# Patient Record
Sex: MALE
[De-identification: names, ages, dates, MRNs, and addresses within clinical notes are randomized per-mention and may not be internally consistent; named-entity substitution may affect disease eponyms.]

---

## 2019-11-05 PROBLEM — Z00.00 ENCOUNTER FOR PREVENTIVE HEALTH EXAMINATION: Status: ACTIVE | Noted: 2019-11-05

## 2019-11-13 ENCOUNTER — APPOINTMENT (OUTPATIENT)
Dept: UROLOGY | Facility: CLINIC | Age: 74
End: 2019-11-13
Payer: MEDICARE

## 2019-11-13 VITALS
BODY MASS INDEX: 20.73 KG/M2 | SYSTOLIC BLOOD PRESSURE: 174 MMHG | TEMPERATURE: 96 F | HEART RATE: 80 BPM | WEIGHT: 140 LBS | HEIGHT: 69 IN | DIASTOLIC BLOOD PRESSURE: 88 MMHG

## 2019-11-13 DIAGNOSIS — Z87.438 PERSONAL HISTORY OF OTHER DISEASES OF MALE GENITAL ORGANS: ICD-10-CM

## 2019-11-13 DIAGNOSIS — Z85.6 PERSONAL HISTORY OF LEUKEMIA: ICD-10-CM

## 2019-11-13 DIAGNOSIS — R35.0 FREQUENCY OF MICTURITION: ICD-10-CM

## 2019-11-13 DIAGNOSIS — Z87.442 PERSONAL HISTORY OF URINARY CALCULI: ICD-10-CM

## 2019-11-13 DIAGNOSIS — Z86.79 PERSONAL HISTORY OF OTHER DISEASES OF THE CIRCULATORY SYSTEM: ICD-10-CM

## 2019-11-13 DIAGNOSIS — Z87.19 PERSONAL HISTORY OF OTHER DISEASES OF THE DIGESTIVE SYSTEM: ICD-10-CM

## 2019-11-13 DIAGNOSIS — R31.29 OTHER MICROSCOPIC HEMATURIA: ICD-10-CM

## 2019-11-13 PROCEDURE — 51798 US URINE CAPACITY MEASURE: CPT

## 2019-11-13 PROCEDURE — 99204 OFFICE O/P NEW MOD 45 MIN: CPT | Mod: 25

## 2019-11-13 RX ORDER — POTASSIUM CHLORIDE 1500 MG/1
20 TABLET, EXTENDED RELEASE ORAL
Refills: 0 | Status: ACTIVE | COMMUNITY

## 2019-11-13 RX ORDER — ASPIRIN 81 MG/1
81 TABLET, CHEWABLE ORAL
Refills: 0 | Status: ACTIVE | COMMUNITY

## 2019-11-13 RX ORDER — SOLIFENACIN SUCCINATE 10 MG/1
10 TABLET, FILM COATED ORAL
Refills: 0 | Status: DISCONTINUED | COMMUNITY
End: 2019-11-13

## 2019-11-13 RX ORDER — IBRUTINIB 140 MG/1
CAPSULE ORAL
Refills: 0 | Status: ACTIVE | COMMUNITY

## 2019-11-13 RX ORDER — CLONAZEPAM 1 MG/1
1 TABLET ORAL
Refills: 0 | Status: ACTIVE | COMMUNITY

## 2019-11-13 RX ORDER — ROSUVASTATIN CALCIUM 40 MG/1
40 TABLET, FILM COATED ORAL
Refills: 0 | Status: ACTIVE | COMMUNITY

## 2019-11-13 RX ORDER — ENALAPRIL MALEATE 20 MG/1
20 TABLET ORAL
Refills: 0 | Status: ACTIVE | COMMUNITY

## 2019-11-13 RX ORDER — ALLOPURINOL 100 MG/1
100 TABLET ORAL
Refills: 0 | Status: ACTIVE | COMMUNITY

## 2019-11-13 NOTE — ASSESSMENT
[FreeTextEntry1] : 73yo male with BPH with frequency, urgency on Vesicare 10mg daily\par Previous lightheadedness with tamsulosin\par Also with h/o microhematuria, h/o nephrolithiasis, mild ED\par Exam today reveals enlarged prostate, high post void residual\par Recommend D/C Vesicare\par F/u 3 weeks for repeat bladder scan\par Reviewed other possible medical or surgical treatment options for BPH\par Check UA, culture, PSA

## 2019-11-13 NOTE — HISTORY OF PRESENT ILLNESS
[FreeTextEntry1] : 74M wants to transfer his urologic care here. Reports few year history of daytime frequency 5-8 times a day. Nighttime is stable 1-2 x. In the remote past, says trialed some anticholinergic medications and now on Vesicare 10 mg for few years, with minimal effect. He wishes to improve frequency issue and manage it adequately. Previous lightheadedness with tamsulosin. \par \par Had reportedly microscopic hematuria, with normal diagnostic cystoscopy x2, many years ago. Was also evaluated for BPH, and was told has "very large prostate." Last PSA 2018, "less then one." \par  [Urinary Frequency] : urinary frequency [Straining] : straining [Weak Stream] : weak stream [Intermittency] : intermittency [None] : None

## 2019-11-13 NOTE — PHYSICAL EXAM
[General Appearance - Well Developed] : well developed [General Appearance - Well Nourished] : well nourished [Normal Appearance] : normal appearance [Well Groomed] : well groomed [General Appearance - In No Acute Distress] : no acute distress [Heart Rate And Rhythm] : Heart rate and rhythm were normal [] : no respiratory distress [Normal Station and Gait] : the gait and station were normal for the patient's age [Skin Color & Pigmentation] : normal skin color and pigmentation [No Focal Deficits] : no focal deficits [Oriented To Time, Place, And Person] : oriented to person, place, and time [Affect] : the affect was normal [Mood] : the mood was normal [Not Anxious] : not anxious [No Palpable Adenopathy] : no palpable adenopathy [Abdomen Soft] : soft [Abdomen Tenderness] : non-tender [Costovertebral Angle Tenderness] : no ~M costovertebral angle tenderness [Prostate Tenderness] : the prostate was not tender [No Prostate Nodules] : no prostate nodules [Prostate Size ___ gm] : prostate size [unfilled] gm [FreeTextEntry1] : PVR = 311

## 2019-11-15 LAB
APPEARANCE: CLEAR
BACTERIA: ABNORMAL
BILIRUBIN URINE: NEGATIVE
BLOOD URINE: ABNORMAL
COLOR: YELLOW
GLUCOSE QUALITATIVE U: NEGATIVE
HYALINE CASTS: 0 /LPF
KETONES URINE: NEGATIVE
LEUKOCYTE ESTERASE URINE: ABNORMAL
MICROSCOPIC-UA: NORMAL
NITRITE URINE: NEGATIVE
PH URINE: 6.5
PROTEIN URINE: NORMAL
PSA FREE FLD-MCNC: 32 %
PSA FREE SERPL-MCNC: 0.65 NG/ML
PSA SERPL-MCNC: 2.04 NG/ML
RED BLOOD CELLS URINE: 19 /HPF
SPECIFIC GRAVITY URINE: 1.02
SQUAMOUS EPITHELIAL CELLS: 3 /HPF
UROBILINOGEN URINE: ABNORMAL
WHITE BLOOD CELLS URINE: 12 /HPF

## 2019-11-16 LAB — BACTERIA UR CULT: ABNORMAL

## 2019-11-18 RX ORDER — CIPROFLOXACIN HYDROCHLORIDE 500 MG/1
500 TABLET, FILM COATED ORAL TWICE DAILY
Qty: 14 | Refills: 0 | Status: ACTIVE | COMMUNITY
Start: 2019-11-18 | End: 1900-01-01

## 2020-01-29 ENCOUNTER — APPOINTMENT (OUTPATIENT)
Dept: UROLOGY | Facility: CLINIC | Age: 75
End: 2020-01-29
Payer: MEDICARE

## 2020-01-29 VITALS — TEMPERATURE: 96 F | DIASTOLIC BLOOD PRESSURE: 68 MMHG | SYSTOLIC BLOOD PRESSURE: 126 MMHG | HEART RATE: 79 BPM

## 2020-01-29 DIAGNOSIS — N52.9 MALE ERECTILE DYSFUNCTION, UNSPECIFIED: ICD-10-CM

## 2020-01-29 PROCEDURE — 51798 US URINE CAPACITY MEASURE: CPT

## 2020-01-29 PROCEDURE — 99213 OFFICE O/P EST LOW 20 MIN: CPT | Mod: 25

## 2020-01-29 RX ORDER — FINASTERIDE 1 MG/1
TABLET ORAL
Refills: 0 | Status: ACTIVE | COMMUNITY

## 2020-01-29 NOTE — PHYSICAL EXAM
[General Appearance - Well Developed] : well developed [General Appearance - Well Nourished] : well nourished [Normal Appearance] : normal appearance [General Appearance - In No Acute Distress] : no acute distress [Well Groomed] : well groomed [Abdomen Soft] : soft [Abdomen Tenderness] : non-tender [Oriented To Time, Place, And Person] : oriented to person, place, and time [Costovertebral Angle Tenderness] : no ~M costovertebral angle tenderness [Mood] : the mood was normal [Affect] : the affect was normal [Not Anxious] : not anxious [No Focal Deficits] : no focal deficits [Normal Station and Gait] : the gait and station were normal for the patient's age [FreeTextEntry1] : PVR = 289

## 2020-01-29 NOTE — HISTORY OF PRESENT ILLNESS
[Straining] : straining [Urinary Frequency] : urinary frequency [Intermittency] : intermittency [Weak Stream] : weak stream [None] : None [FreeTextEntry1] : 74M wants to transfer his urologic care here. Reports few year history of daytime frequency 5-8 times a day. Nighttime is stable 1-2 x. In the remote past, says trialed some anticholinergic medications and now on Vesicare 10 mg for few years, with minimal effect. He wishes to improve frequency issue and manage it adequately. Previous lightheadedness with tamsulosin. \par \par Had reportedly microscopic hematuria, with normal diagnostic cystoscopy x2, many years ago. Was also evaluated for BPH, and was told has "very large prostate." Last PSA 2018, "less then one." \par \par 1/29/20 Here for f/u. Too Cipro for Ecoli UTI after last visit. Urinary symptoms essentially the same. He did not stop Vesicare as we discussed.

## 2020-01-29 NOTE — ASSESSMENT
[FreeTextEntry1] : 73yo male with BPH with frequency, urgency on Vesicare 10mg daily and finasteride 5mg daily\par Previous lightheadedness with tamsulosin\par Also with h/o microhematuria, h/o nephrolithiasis, mild ED\par High post void residual\par Recommend D/C Vesicare\par F/u 1 month\par Reviewed other possible medical or surgical treatment options for BPH\par He would like to continue medical therapy for now\par Repeat urine culture

## 2020-01-31 LAB
APPEARANCE: CLEAR
BACTERIA UR CULT: NORMAL
BACTERIA: NEGATIVE
BILIRUBIN URINE: NEGATIVE
BLOOD URINE: ABNORMAL
COLOR: YELLOW
GLUCOSE QUALITATIVE U: NEGATIVE
HYALINE CASTS: 0 /LPF
KETONES URINE: NEGATIVE
LEUKOCYTE ESTERASE URINE: NEGATIVE
MICROSCOPIC-UA: NORMAL
NITRITE URINE: NEGATIVE
PH URINE: 6
PROTEIN URINE: NEGATIVE
RED BLOOD CELLS URINE: 20 /HPF
SPECIFIC GRAVITY URINE: 1.02
SQUAMOUS EPITHELIAL CELLS: 0 /HPF
UROBILINOGEN URINE: NORMAL
WHITE BLOOD CELLS URINE: 1 /HPF

## 2020-03-02 ENCOUNTER — APPOINTMENT (OUTPATIENT)
Dept: UROLOGY | Facility: CLINIC | Age: 75
End: 2020-03-02
Payer: MEDICARE

## 2020-03-02 VITALS — DIASTOLIC BLOOD PRESSURE: 79 MMHG | TEMPERATURE: 97.3 F | HEART RATE: 69 BPM | SYSTOLIC BLOOD PRESSURE: 160 MMHG

## 2020-03-02 DIAGNOSIS — Z87.440 PERSONAL HISTORY OF URINARY (TRACT) INFECTIONS: ICD-10-CM

## 2020-03-02 PROCEDURE — 99213 OFFICE O/P EST LOW 20 MIN: CPT | Mod: 25

## 2020-03-02 PROCEDURE — 51798 US URINE CAPACITY MEASURE: CPT

## 2020-03-02 NOTE — HISTORY OF PRESENT ILLNESS
[FreeTextEntry1] : 74M wants to transfer his urologic care here. Reports few year history of daytime frequency 5-8 times a day. Nighttime is stable 1-2 x. In the remote past, says trialed some anticholinergic medications and now on Vesicare 10 mg for few years, with minimal effect. He wishes to improve frequency issue and manage it adequately. Previous lightheadedness with tamsulosin. \par \par Had reportedly microscopic hematuria, with normal diagnostic cystoscopy x2, many years ago. Was also evaluated for BPH, and was told has "very large prostate." Last PSA 2018, "less then one." \par \par 1/29/20 Here for f/u. Too Cipro for Ecoli UTI after last visit. Urinary symptoms essentially the same. He did not stop Vesicare as we discussed. \par \par 3/02/20 Here for f/u. Stopped Vesicare at last visit and overall improved. Had microhematuria at last visit but this was shortly after UTI.  [Urinary Frequency] : urinary frequency [Weak Stream] : weak stream [Straining] : straining [None] : None [Intermittency] : intermittency

## 2020-03-02 NOTE — ASSESSMENT
[FreeTextEntry1] : 75yo male with BPH with frequency, urgency on Vesicare 10mg daily and finasteride 5mg daily\par Previous lightheadedness with tamsulosin\par Also with h/o microhematuria, h/o nephrolithiasis, mild ED\par Symptoms and PVR have improved since stopping Vesicare\par Repeat UA\par If microhematuria, check upper tract imaging and cystoscopy\par If UA negative, f/u 2 months

## 2020-03-02 NOTE — PHYSICAL EXAM
[General Appearance - Well Developed] : well developed [General Appearance - Well Nourished] : well nourished [Normal Appearance] : normal appearance [Well Groomed] : well groomed [General Appearance - In No Acute Distress] : no acute distress [Abdomen Soft] : soft [Abdomen Tenderness] : non-tender [FreeTextEntry1] : PVR = 136 [Costovertebral Angle Tenderness] : no ~M costovertebral angle tenderness [Oriented To Time, Place, And Person] : oriented to person, place, and time [Affect] : the affect was normal [Not Anxious] : not anxious [Mood] : the mood was normal [Normal Station and Gait] : the gait and station were normal for the patient's age [No Focal Deficits] : no focal deficits

## 2020-03-03 LAB
APPEARANCE: CLEAR
BACTERIA: NEGATIVE
BILIRUBIN URINE: NEGATIVE
BLOOD URINE: ABNORMAL
COLOR: YELLOW
GLUCOSE QUALITATIVE U: NEGATIVE
HYALINE CASTS: 0 /LPF
KETONES URINE: NEGATIVE
LEUKOCYTE ESTERASE URINE: NEGATIVE
MICROSCOPIC-UA: NORMAL
NITRITE URINE: NEGATIVE
PH URINE: 6
PROTEIN URINE: NORMAL
RED BLOOD CELLS URINE: 8 /HPF
SPECIFIC GRAVITY URINE: 1.02
SQUAMOUS EPITHELIAL CELLS: 0 /HPF
UROBILINOGEN URINE: NORMAL
WHITE BLOOD CELLS URINE: 1 /HPF

## 2020-03-04 LAB — BACTERIA UR CULT: NORMAL

## 2020-05-04 ENCOUNTER — APPOINTMENT (OUTPATIENT)
Dept: UROLOGY | Facility: CLINIC | Age: 75
End: 2020-05-04

## 2020-08-03 ENCOUNTER — APPOINTMENT (OUTPATIENT)
Dept: UROLOGY | Facility: CLINIC | Age: 75
End: 2020-08-03
Payer: MEDICARE

## 2020-08-03 VITALS
HEART RATE: 66 BPM | DIASTOLIC BLOOD PRESSURE: 80 MMHG | WEIGHT: 140 LBS | TEMPERATURE: 98 F | SYSTOLIC BLOOD PRESSURE: 140 MMHG | HEIGHT: 69 IN | BODY MASS INDEX: 20.73 KG/M2

## 2020-08-03 PROCEDURE — 99213 OFFICE O/P EST LOW 20 MIN: CPT

## 2020-08-03 NOTE — PHYSICAL EXAM
[General Appearance - Well Nourished] : well nourished [General Appearance - Well Developed] : well developed [Normal Appearance] : normal appearance [Well Groomed] : well groomed [General Appearance - In No Acute Distress] : no acute distress [Abdomen Soft] : soft [Abdomen Tenderness] : non-tender [Urinary Bladder Findings] : the bladder was normal on palpation [Costovertebral Angle Tenderness] : no ~M costovertebral angle tenderness [Oriented To Time, Place, And Person] : oriented to person, place, and time [Affect] : the affect was normal [Not Anxious] : not anxious [No Focal Deficits] : no focal deficits [Normal Station and Gait] : the gait and station were normal for the patient's age [Mood] : the mood was normal

## 2020-08-03 NOTE — ASSESSMENT
[FreeTextEntry1] : 74yo male with BPH with frequency, urgency on Vesicare 10mg daily and finasteride 5mg daily\par Previous lightheadedness with tamsulosin\par Also with h/o microhematuria, h/o nephrolithiasis, mild ED\par Advised to have renal US for upper tract imaging since he refused CT with contrast\par Advised to have cystoscopy but currently refusing. Advised that this is the only way to rule out a bladder cancer as cause of hematuria\par F/u 6 months

## 2020-08-03 NOTE — HISTORY OF PRESENT ILLNESS
[FreeTextEntry1] : 74M wants to transfer his urologic care here. Reports few year history of daytime frequency 5-8 times a day. Nighttime is stable 1-2 x. In the remote past, says trialed some anticholinergic medications and now on Vesicare 10 mg for few years, with minimal effect. He wishes to improve frequency issue and manage it adequately. Previous lightheadedness with tamsulosin. \par \par Had reportedly microscopic hematuria, with normal diagnostic cystoscopy x2, many years ago. Was also evaluated for BPH, and was told has "very large prostate." Last PSA 2018, "less then one." \par \par 1/29/20 Here for f/u. Too Cipro for Ecoli UTI after last visit. Urinary symptoms essentially the same. He did not stop Vesicare as we discussed. \par \par 3/02/20 Here for f/u. Stopped Vesicare at last visit and overall improved. Had microhematuria at last visit but this was shortly after UTI. \par \par 8/03/20 Here for f/u. Had microhematuria at last visit and was recommended to have CT urogram and cystoscopy. He was concerned about contrast and renal US was ordered but not performed. Scheduled for cystoscopy today but he is now declining. Feeling well, no complaints.  [Urinary Frequency] : urinary frequency [None] : None

## 2020-08-04 LAB
APPEARANCE: CLEAR
BACTERIA: NEGATIVE
BILIRUBIN URINE: NEGATIVE
BLOOD URINE: ABNORMAL
COLOR: YELLOW
GLUCOSE QUALITATIVE U: NEGATIVE
HYALINE CASTS: 0 /LPF
KETONES URINE: NEGATIVE
LEUKOCYTE ESTERASE URINE: NEGATIVE
MICROSCOPIC-UA: NORMAL
NITRITE URINE: NEGATIVE
PH URINE: 6.5
PROTEIN URINE: NORMAL
RED BLOOD CELLS URINE: 7 /HPF
SPECIFIC GRAVITY URINE: 1.02
SQUAMOUS EPITHELIAL CELLS: 1 /HPF
UROBILINOGEN URINE: NORMAL
WHITE BLOOD CELLS URINE: 1 /HPF

## 2020-08-06 LAB — BACTERIA UR CULT: ABNORMAL

## 2020-12-23 PROBLEM — Z87.440 HISTORY OF URINARY TRACT INFECTION: Status: RESOLVED | Noted: 2019-11-18 | Resolved: 2020-12-23

## 2021-02-01 ENCOUNTER — APPOINTMENT (OUTPATIENT)
Dept: UROLOGY | Facility: CLINIC | Age: 76
End: 2021-02-01

## 2021-03-03 ENCOUNTER — APPOINTMENT (OUTPATIENT)
Dept: UROLOGY | Facility: CLINIC | Age: 76
End: 2021-03-03
Payer: MEDICARE

## 2021-03-03 VITALS — DIASTOLIC BLOOD PRESSURE: 75 MMHG | SYSTOLIC BLOOD PRESSURE: 152 MMHG | TEMPERATURE: 97.9 F | HEART RATE: 78 BPM

## 2021-03-03 DIAGNOSIS — N13.8 BENIGN PROSTATIC HYPERPLASIA WITH LOWER URINARY TRACT SYMPMS: ICD-10-CM

## 2021-03-03 DIAGNOSIS — N40.1 BENIGN PROSTATIC HYPERPLASIA WITH LOWER URINARY TRACT SYMPMS: ICD-10-CM

## 2021-03-03 DIAGNOSIS — Z87.442 PERSONAL HISTORY OF URINARY CALCULI: ICD-10-CM

## 2021-03-03 DIAGNOSIS — R31.29 OTHER MICROSCOPIC HEMATURIA: ICD-10-CM

## 2021-03-03 DIAGNOSIS — N28.1 CYST OF KIDNEY, ACQUIRED: ICD-10-CM

## 2021-03-03 PROCEDURE — 99213 OFFICE O/P EST LOW 20 MIN: CPT

## 2021-03-03 PROCEDURE — 99072 ADDL SUPL MATRL&STAF TM PHE: CPT

## 2021-03-03 NOTE — ASSESSMENT
[FreeTextEntry1] : 76yo male with BPH with frequency, urgency on Vesicare 10mg daily and finasteride 5mg daily\par Previous lightheadedness with tamsulosin\par Also with h/o microhematuria, h/o nephrolithiasis, mild ED\par Reviewed renal US from 1/2021, again small complex renal cyst\par Reviewed recommendation for MRI with contrast\par Reviewed creatinine results, not contraindicated\par He is also declining cystoscopy\par Will switch to [facility] for insurance reasons\par F/u 6 months

## 2021-03-03 NOTE — HISTORY OF PRESENT ILLNESS
[Urinary Frequency] : urinary frequency [None] : None [FreeTextEntry1] : 74M wants to transfer his urologic care here. Reports few year history of daytime frequency 5-8 times a day. Nighttime is stable 1-2 x. In the remote past, says trialed some anticholinergic medications and now on Vesicare 10 mg for few years, with minimal effect. He wishes to improve frequency issue and manage it adequately. Previous lightheadedness with tamsulosin. \par \par Had reportedly microscopic hematuria, with normal diagnostic cystoscopy x2, many years ago. Was also evaluated for BPH, and was told has "very large prostate." Last PSA 2018, "less then one." \par \par 1/29/20 Here for f/u. Too Cipro for Ecoli UTI after last visit. Urinary symptoms essentially the same. He did not stop Vesicare as we discussed. \par \par 3/02/20 Here for f/u. Stopped Vesicare at last visit and overall improved. Had microhematuria at last visit but this was shortly after UTI. \par \par 8/03/20 Here for f/u. Had microhematuria at last visit and was recommended to have CT urogram and cystoscopy. He was concerned about contrast and renal US was ordered but not performed. Scheduled for cystoscopy today but he is now declining. Feeling well, no complaints. \par \par 3/03/21 Here for f/u. Had repeat MRI with nephrologist which again shows complex right renal cyst measuring about 2cm, MRI with contrast was recommended but he wants to hold off. Also deferring cystoscopy. Creatinine around 1.8.

## 2021-04-28 ENCOUNTER — RX RENEWAL (OUTPATIENT)
Age: 76
End: 2021-04-28

## 2021-04-28 RX ORDER — FESOTERODINE FUMARATE 8 MG/1
8 TABLET, FILM COATED, EXTENDED RELEASE ORAL
Qty: 30 | Refills: 5 | Status: ACTIVE | COMMUNITY
Start: 2021-03-03 | End: 1900-01-01

## 2021-05-10 ENCOUNTER — RX RENEWAL (OUTPATIENT)
Age: 76
End: 2021-05-10

## 2021-05-10 RX ORDER — SILDENAFIL 25 MG/1
25 TABLET ORAL
Qty: 6 | Refills: 3 | Status: ACTIVE | COMMUNITY
Start: 2021-05-10 | End: 1900-01-01